# Patient Record
Sex: FEMALE | Race: WHITE | Employment: FULL TIME | URBAN - METROPOLITAN AREA
[De-identification: names, ages, dates, MRNs, and addresses within clinical notes are randomized per-mention and may not be internally consistent; named-entity substitution may affect disease eponyms.]

---

## 2021-10-06 ENCOUNTER — OFFICE VISIT (OUTPATIENT)
Dept: CARDIOLOGY CLINIC | Age: 41
End: 2021-10-06
Payer: COMMERCIAL

## 2021-10-06 VITALS
HEIGHT: 61 IN | SYSTOLIC BLOOD PRESSURE: 129 MMHG | BODY MASS INDEX: 23.41 KG/M2 | DIASTOLIC BLOOD PRESSURE: 82 MMHG | HEART RATE: 86 BPM | WEIGHT: 124 LBS

## 2021-10-06 DIAGNOSIS — R07.9 CHEST PAIN, UNSPECIFIED TYPE: Primary | ICD-10-CM

## 2021-10-06 PROCEDURE — 99203 OFFICE O/P NEW LOW 30 MIN: CPT | Performed by: INTERNAL MEDICINE

## 2021-10-06 PROCEDURE — 93000 ELECTROCARDIOGRAM COMPLETE: CPT | Performed by: INTERNAL MEDICINE

## 2021-10-06 NOTE — PROGRESS NOTES
1. Have you been to the ER, urgent care clinic since your last visit? Hospitalized since your last visit?     no    2. Have you seen or consulted any other health care providers outside of the [address] [place] [address] since your last visit? Include any pap smears or colon screening. no    3. Since your last visit, have you had any of the following symptoms? Sob.  Chest pains, some palpitations

## 2021-10-11 NOTE — PROGRESS NOTES
HISTORY OF PRESENT ILLNESS  Jessica Rodriguez is a 38 y.o. female. New Patient  The history is provided by the patient. This is a recurrent problem. The problem occurs every several days. The problem has not changed since onset. Associated symptoms include chest pain. Pertinent negatives include no abdominal pain, no headaches and no shortness of breath. Chest Pain   The history is provided by the patient. This is a recurrent problem. The problem has not changed since onset. The problem occurs every several days. The pain is at a severity of 3/10. The pain is mild. The quality of the pain is described as tightness. The pain radiates to the left shoulder. Pertinent negatives include no abdominal pain, no claudication, no cough, no diaphoresis, no dizziness, no fever, no headaches, no hemoptysis, no nausea, no orthopnea, no palpitations, no PND, no shortness of breath, no sputum production, no vomiting and no weakness. Review of Systems   Constitutional: Negative for chills, diaphoresis, fever and weight loss. HENT: Negative for ear pain and hearing loss. Eyes: Negative for blurred vision. Respiratory: Negative for cough, hemoptysis, sputum production, shortness of breath, wheezing and stridor. Cardiovascular: Positive for chest pain. Negative for palpitations, orthopnea, claudication, leg swelling and PND. Gastrointestinal: Negative for abdominal pain, heartburn, nausea and vomiting. Musculoskeletal: Negative for myalgias and neck pain. Skin: Negative for rash. Neurological: Negative for dizziness, tingling, tremors, focal weakness, loss of consciousness, weakness and headaches. Psychiatric/Behavioral: Negative for depression and suicidal ideas.      Family History   Problem Relation Age of Onset    Heart Disease Paternal Grandfather     Heart Disease Sister     Cancer Maternal Grandmother         breast cancer       Past Medical History:   Diagnosis Date    Foot pain, bilateral 2014 Past Surgical History:   Procedure Laterality Date    HX APPENDECTOMY  2009    HX  SECTION  , ,     HX TONSILLECTOMY      HX TUMOR REMOVAL  2002       Social History     Tobacco Use    Smoking status: Former Smoker    Smokeless tobacco: Never Used    Tobacco comment: 5 cigarettes a day   Substance Use Topics    Alcohol use: Yes     Comment: socially occ       No Known Allergies    Outpatient Medications Marked as Taking for the 10/6/21 encounter (Office Visit) with Andra Braun MD   Medication Sig Dispense Refill    ibuprofen (MOTRIN) 200 mg tablet Take  by mouth. Visit Vitals  /82   Pulse 86   Ht 5' 1\" (1.549 m)   Wt 56.2 kg (124 lb)   BMI 23.43 kg/m²       Physical Exam  Constitutional:       Appearance: She is well-developed. HENT:      Head: Normocephalic and atraumatic. Eyes:      General: No scleral icterus. Conjunctiva/sclera: Conjunctivae normal.   Neck:      Vascular: No JVD. Cardiovascular:      Rate and Rhythm: Normal rate and regular rhythm. Heart sounds: Normal heart sounds. No murmur heard. No friction rub. No gallop. Pulmonary:      Effort: Pulmonary effort is normal. No respiratory distress. Breath sounds: Normal breath sounds. No stridor. No wheezing or rales. Chest:      Chest wall: No tenderness. Abdominal:      General: There is no distension. Tenderness: There is no abdominal tenderness. Musculoskeletal:         General: No tenderness. Normal range of motion. Cervical back: Normal range of motion and neck supple. Lymphadenopathy:      Cervical: No cervical adenopathy. Skin:     Findings: No erythema or rash. Neurological:      Mental Status: She is alert and oriented to person, place, and time. Cranial Nerves: No cranial nerve deficit. Psychiatric:         Behavior: Behavior normal.     ekg sinus rhythm with no acute st-t changes    ASSESSMENT and PLAN    ICD-10-CM ICD-9-CM    1. Chest pain, unspecified type  R07.9 786.50 AMB POC EKG ROUTINE W/ 12 LEADS, INTER & REP      ECHO STRESS     Orders Placed This Encounter    AMB POC EKG ROUTINE W/ 12 LEADS, INTER & REP     Order Specific Question:   Reason for Exam:     Answer:   chest pain    ECHO STRESS     Standing Status:   Future     Standing Expiration Date:   4/6/2022     Order Specific Question:   Is Patient Pregnant? Answer:   Unknown     Order Specific Question:   Contrast Enhancement (Bubble Study, Definity, Optison) may be used if criteria listed in established evidence-based protocol has been identified. Answer:   Yes     Order Specific Question:   Enhanced Imaging (Myocardial Strain, 3D post-processing) may be used if criteria listed in established evidence-based protocol has been identified. Answer:   Yes     Order Specific Question:   Stressing Agent     Answer:   Exercise     Follow-up and Dispositions    · Return in about 3 weeks (around 10/27/2021). current treatment plan is effective, no change in therapy. Patient is a 38 yr old female with no significant past medical history seen for chest pain with both typical and atypical features. ekg sinus rhythm with no acute st-t changes  Will obtain stress echo to assess LV function.   F/u in 3 weeks

## 2021-11-03 ENCOUNTER — OFFICE VISIT (OUTPATIENT)
Dept: CARDIOLOGY CLINIC | Age: 41
End: 2021-11-03
Payer: COMMERCIAL

## 2021-11-03 VITALS
HEIGHT: 61 IN | WEIGHT: 125 LBS | OXYGEN SATURATION: 98 % | BODY MASS INDEX: 23.6 KG/M2 | DIASTOLIC BLOOD PRESSURE: 88 MMHG | SYSTOLIC BLOOD PRESSURE: 128 MMHG | HEART RATE: 84 BPM

## 2021-11-03 DIAGNOSIS — R07.9 CHEST PAIN, UNSPECIFIED TYPE: Primary | ICD-10-CM

## 2021-11-03 PROCEDURE — 99213 OFFICE O/P EST LOW 20 MIN: CPT | Performed by: INTERNAL MEDICINE

## 2021-11-03 NOTE — PROGRESS NOTES
HISTORY OF PRESENT ILLNESS  Arya Huston is a 38 y.o. female. Chest Pain (Angina)   The history is provided by the patient. This is a recurrent problem. The problem has not changed since onset. The problem occurs rarely. Pertinent negatives include no claudication, no cough, no diaphoresis, no dizziness, no fever, no hemoptysis, no nausea, no orthopnea, no palpitations, no PND, no sputum production, no vomiting and no weakness. Review of Systems   Constitutional: Negative for chills, diaphoresis, fever and weight loss. HENT: Negative for ear pain and hearing loss. Eyes: Negative for blurred vision. Respiratory: Negative for cough, hemoptysis, sputum production, wheezing and stridor. Cardiovascular: Positive for chest pain. Negative for palpitations, orthopnea, claudication, leg swelling and PND. Gastrointestinal: Negative for heartburn, nausea and vomiting. Musculoskeletal: Negative for myalgias and neck pain. Skin: Negative for rash. Neurological: Negative for dizziness, tingling, tremors, focal weakness, loss of consciousness and weakness. Psychiatric/Behavioral: Negative for depression and suicidal ideas.      Family History   Problem Relation Age of Onset    Heart Disease Paternal Grandfather     Heart Disease Sister     Cancer Maternal Grandmother         breast cancer       Past Medical History:   Diagnosis Date    Foot pain, bilateral        Past Surgical History:   Procedure Laterality Date    HX APPENDECTOMY      HX  SECTION  , ,     HX TONSILLECTOMY      HX TUMOR REMOVAL         Social History     Tobacco Use    Smoking status: Former Smoker    Smokeless tobacco: Never Used    Tobacco comment: 5 cigarettes a day   Substance Use Topics    Alcohol use: Yes     Comment: socially occ       No Known Allergies         Visit Vitals  /88 (BP 1 Location: Right arm, BP Patient Position: Sitting)   Pulse 84   Ht 5' 1\" (1.549 m)   Wt 56.7 kg (125 lb)   SpO2 98%   BMI 23.62 kg/m²       Physical Exam  Constitutional:       Appearance: She is well-developed. HENT:      Head: Normocephalic and atraumatic. Eyes:      General: No scleral icterus. Conjunctiva/sclera: Conjunctivae normal.   Neck:      Vascular: No JVD. Cardiovascular:      Rate and Rhythm: Normal rate and regular rhythm. Heart sounds: Normal heart sounds. No murmur heard. No friction rub. No gallop. Pulmonary:      Effort: Pulmonary effort is normal. No respiratory distress. Breath sounds: Normal breath sounds. No stridor. No wheezing or rales. Chest:      Chest wall: No tenderness. Abdominal:      General: There is no distension. Tenderness: There is no abdominal tenderness. Musculoskeletal:         General: No tenderness. Normal range of motion. Cervical back: Normal range of motion and neck supple. Lymphadenopathy:      Cervical: No cervical adenopathy. Skin:     Findings: No erythema or rash. Neurological:      Mental Status: She is alert and oriented to person, place, and time. Cranial Nerves: No cranial nerve deficit. Psychiatric:         Behavior: Behavior normal.     ekg sinus rhythm with no acute st-t changes      ASSESSMENT and PLAN    ICD-10-CM ICD-9-CM    1. Chest pain, unspecified type  R07.9 786.50      No orders of the defined types were placed in this encounter. Follow-up and Dispositions    · Return if symptoms worsen or fail to improve. current treatment plan is effective, no change in therapy. Patient is a 38 yr old female with no significant past medical history seen for chest pain with both typical and atypical features. ekg sinus rhythm with no acute st-t changes  Recent stress echo negative for WMA/ ekg changes. Likely from GERD. Recommend to follow up with PCP.

## 2021-12-02 ENCOUNTER — OFFICE VISIT (OUTPATIENT)
Dept: FAMILY MEDICINE CLINIC | Age: 41
End: 2021-12-02
Payer: COMMERCIAL

## 2021-12-02 VITALS
HEART RATE: 94 BPM | SYSTOLIC BLOOD PRESSURE: 114 MMHG | RESPIRATION RATE: 20 BRPM | OXYGEN SATURATION: 100 % | HEIGHT: 61 IN | TEMPERATURE: 98.4 F | WEIGHT: 127 LBS | DIASTOLIC BLOOD PRESSURE: 77 MMHG | BODY MASS INDEX: 23.98 KG/M2

## 2021-12-02 DIAGNOSIS — Z11.59 ENCOUNTER FOR HEPATITIS C SCREENING TEST FOR LOW RISK PATIENT: ICD-10-CM

## 2021-12-02 DIAGNOSIS — Z13.0 SCREENING FOR ENDOCRINE, METABOLIC, AND IMMUNITY DISORDER: ICD-10-CM

## 2021-12-02 DIAGNOSIS — Z13.29 SCREENING FOR ENDOCRINE, METABOLIC, AND IMMUNITY DISORDER: ICD-10-CM

## 2021-12-02 DIAGNOSIS — S16.1XXA STRAIN OF NECK MUSCLE, INITIAL ENCOUNTER: ICD-10-CM

## 2021-12-02 DIAGNOSIS — Z13.228 SCREENING FOR ENDOCRINE, METABOLIC, AND IMMUNITY DISORDER: ICD-10-CM

## 2021-12-02 LAB — HBA1C MFR BLD HPLC: 5.3 %

## 2021-12-02 PROCEDURE — 99203 OFFICE O/P NEW LOW 30 MIN: CPT | Performed by: FAMILY MEDICINE

## 2021-12-02 PROCEDURE — 83036 HEMOGLOBIN GLYCOSYLATED A1C: CPT | Performed by: FAMILY MEDICINE

## 2021-12-02 PROCEDURE — 36415 COLL VENOUS BLD VENIPUNCTURE: CPT | Performed by: FAMILY MEDICINE

## 2021-12-02 NOTE — PROGRESS NOTES
Patient presents for lab draw ordered by Dr Yunier Martinez  Ordering Department/Practice:  [facility]  Date Ordered:  12-2-2021     The following labs were drawn and sent to [facility]     CBC, Lipid Profile, CMP, TSH, 3rd Generation, HgA1C and Hep C screening    The following tubes were sent:    Gold  ( 2) and Lavender  ( 1)    Draw site:  LAC  Pain Level:0  Needle Gauge21  Aseptic technique used  Blood thinners:n  Band-Aid applied  Draw fee added   Procedure tolerated well, patient voiced no complaints.

## 2021-12-02 NOTE — PATIENT INSTRUCTIONS
Neck Strain: Care Instructions  Your Care Instructions     You have strained the muscles and ligaments in your neck. A sudden, awkward movement can strain the neck. This often occurs with falls or car accidents or during certain sports. Everyday activities like working on a computer or sleeping can also cause neck strain if they force you to hold your neck in an awkward position for a long time. It is common for neck pain to get worse for a day or two after an injury, but it should start to feel better after that. You may have more pain and stiffness for several days before it gets better. This is expected. It may take a few weeks or longer for it to heal completely. Good home treatment can help you get better faster and avoid future neck problems. Follow-up care is a key part of your treatment and safety. Be sure to make and go to all appointments, and call your doctor if you are having problems. It's also a good idea to know your test results and keep a list of the medicines you take. How can you care for yourself at home? · If you were given a neck brace (cervical collar) to limit neck motion, wear it as instructed for as many days as your doctor tells you to. Do not wear it longer than you were told to. Wearing a brace for too long can make neck stiffness worse and weaken the neck muscles. · You can try using heat or ice to see if it helps. ? Try using a heating pad on a low or medium setting for 15 to 20 minutes every 2 to 3 hours. Try a warm shower in place of one session with the heating pad. You can also buy single-use heat wraps that last up to 8 hours. ? You can also try an ice pack for 10 to 15 minutes every 2 to 3 hours. · Take pain medicines exactly as directed. ? If the doctor gave you a prescription medicine for pain, take it as prescribed. ? If you are not taking a prescription pain medicine, ask your doctor if you can take an over-the-counter medicine.   · Gently rub the area to relieve pain and help with blood flow. Do not massage the area if it hurts to do so. · Do not do anything that makes the pain worse. Take it easy for a couple of days. You can do your usual activities if they do not hurt your neck or put it at risk for more stress or injury. · Try sleeping on a special neck pillow. Place it under your neck, not under your head. Placing a tightly rolled-up towel under your neck while you sleep will also work. If you use a neck pillow or rolled towel, do not use your regular pillow at the same time. · To prevent future neck pain, do exercises to stretch and strengthen your neck and back. Learn how to use good posture, safe lifting techniques, and proper body mechanics. When should you call for help? Call 911 anytime you think you may need emergency care. For example, call if:    · You are unable to move an arm or a leg at all. Call your doctor now or seek immediate medical care if:    · You have new or worse symptoms in your arms, legs, chest, belly, or buttocks. Symptoms may include:  ? Numbness or tingling. ? Weakness. ? Pain.     · You lose bladder or bowel control. Watch closely for changes in your health, and be sure to contact your doctor if:    · You are not getting better as expected. Where can you learn more? Go to http://www.gray.com/  Enter M253 in the search box to learn more about \"Neck Strain: Care Instructions. \"  Current as of: July 1, 2021               Content Version: 13.0  © 2006-2021 [employer]. Care instructions adapted under license by [employer] (which disclaims liability or warranty for this information). If you have questions about a medical condition or this instruction, always ask your healthcare professional. [place] [address] any warranty or liability for your use of this information.

## 2021-12-02 NOTE — PROGRESS NOTES
HPI  This is a 79-year-old  female with past medical history significant for parotid gland tumor, breast abscess status post drainage 1 month ago who is here to establish care and for annual physical for work. Patient has recent mammogram done when she had breast abscess drained last month. She had pap smear done with her OBGYN at [facility], we will obtain records from. Left shoulder and neck pain  Noted yesterday, 5-6 out of 10 in intensity, aching. Pain with above head hand raising and slightly with turning the head to the left. Denies any tingling, numbness, weakness, weakened  strength, injury. 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     Past Medical History  Past Medical History:   Diagnosis Date    Foot pain, bilateral     History of carotid body tumor        Surgical History  Past Surgical History:   Procedure Laterality Date    HX APPENDECTOMY  2009    HX  SECTION  , ,     HX TONSILLECTOMY      HX TUMOR REMOVAL          Medications      Allergies  No Known Allergies    Family History  Family History   Problem Relation Age of Onset    Heart Disease Paternal Grandfather     Heart Disease Sister     Cancer Maternal Grandmother         breast cancer       Social History  Social History     Socioeconomic History    Marital status:      Spouse name: Not on file    Number of children: Not on file    Years of education: Not on file    Highest education level: Not on file   Occupational History    Not on file   Tobacco Use    Smoking status: Light Tobacco Smoker    Smokeless tobacco: Never Used    Tobacco comment: 5 cigarettes a day   Substance and Sexual Activity    Alcohol use: Yes     Comment: socially occ    Drug use: No    Sexual activity: Not on file   Other Topics Concern    Not on file   Social History Narrative    Not on file     Social Determinants of Health     Financial Resource Strain:  Difficulty of Paying Living Expenses: Not on file   Food Insecurity:     Worried About Running Out of Food in the Last Year: Not on file    Ran Out of Food in the Last Year: Not on file   Transportation Needs:     Lack of Transportation (Medical): Not on file    Lack of Transportation (Non-Medical): Not on file   Physical Activity:     Days of Exercise per Week: Not on file    Minutes of Exercise per Session: Not on file   Stress:     Feeling of Stress : Not on file   Social Connections:     Frequency of Communication with Friends and Family: Not on file    Frequency of Social Gatherings with Friends and Family: Not on file    Attends Catholic Services: Not on file    Active Member of [address] [place] [address] or Organizations: Not on file    Attends Club or Organization Meetings: Not on file    Marital Status: Not on file   Intimate Partner Violence:     Fear of Current or Ex-Partner: Not on file    Emotionally Abused: Not on file    Physically Abused: Not on file    Sexually Abused: Not on file   Housing Stability:     Unable to Pay for Housing in the Last Year: Not on file    Number of [place] in the Last Year: Not on file    Unstable Housing in the Last Year: Not on file       Review of Systems  Review of Systems   Constitutional: Negative for chills and fever. Respiratory: Negative for cough and shortness of breath. Cardiovascular: Negative for chest pain and leg swelling. Gastrointestinal: Negative for abdominal pain, nausea and vomiting. Musculoskeletal: Positive for neck pain. Skin: Negative for rash. Neurological: Negative for dizziness, tingling, weakness and headaches.          Vital Signs  Visit Vitals  /77 (BP 1 Location: Left upper arm, BP Patient Position: Sitting, BP Cuff Size: Adult long)   Pulse 94   Temp 98.4 °F (36.9 °C) (Oral)   Resp 20   Ht 5' 1\" (1.549 m)   Wt 127 lb (57.6 kg)   LMP 11/19/2021   SpO2 100%   BMI 24.00 kg/m²         Physical Exam  Physical Exam  Vitals reviewed. Constitutional:       Appearance: Normal appearance. HENT:      Head: Normocephalic and atraumatic. Right Ear: External ear normal.      Left Ear: External ear normal.      Nose: Nose normal.      Mouth/Throat:      Mouth: Mucous membranes are moist.   Eyes:      Extraocular Movements: Extraocular movements intact. Conjunctiva/sclera: Conjunctivae normal.   Cardiovascular:      Rate and Rhythm: Normal rate and regular rhythm. Pulses: Normal pulses. Heart sounds: Normal heart sounds. Pulmonary:      Effort: Pulmonary effort is normal.      Breath sounds: Normal breath sounds. Abdominal:      General: Bowel sounds are normal.      Palpations: Abdomen is soft. Musculoskeletal:         General: Tenderness present. No swelling, deformity or signs of injury. Normal range of motion. Cervical back: Normal range of motion. Comments: Pain with above head hand raising, pain with turning head to the left. Pain with liftoff test in the shoulder. 5 strength in bilateral upper extremities. Sensations intact, good . There is to palpation of the left-sided cervical muscles. Skin:     General: Skin is warm. Neurological:      General: No focal deficit present. Mental Status: She is alert and oriented to person, place, and time. Cranial Nerves: No cranial nerve deficit. Motor: No weakness.       Gait: Gait normal.   Psychiatric:         Mood and Affect: Mood normal.         Behavior: Behavior normal.                Results  Results for orders placed or performed in visit on 10/12/21   ECHO STRESS   Result Value Ref Range    Target  bpm    STRESS SR LYON TREADMILL SCORE 9     Estimated workload 11 METS    ST Elevation (mm) 0 mm    ST Depression (mm) 0 mm    Angina Index 0     Exercise duration time 9:20 min:sec    Baseline HR 89 bpm    Baseline  mmHg    Stress Base Diastolic BP 85 mmHg    Post peak  bpm    Percent  % Stress Base Systolic  mmHg    Stress Rate Pressure Product 33,600 bpm*mmHg    Stress Base Diastolic  mmHg    Stress Stage 1  bpm    Stress Stage 1 /78 mmHg    Stress Stage 2  bpm    Stress Stage 2 /82 mmHg    Stress Stage 3  bpm    Stress Stage 3 /94 mmHg    Recovery Stage 1  bpm    Recovery Stage 1 /100 mmHg    Recovery Stage 2  bpm    Recovery Stage 2 /78 mmHg    Recovery Stage 3 HR 98 bpm    Recovery Stage 3 /70 mmHg    Recovery Stage 4  bpm    Recovery Stage 4 /78 mmHg       ASSESSMENT and PLAN  1. Strain of neck muscle, initial encounter  Advised patient to used ibuprofen 600 mg 3 times daily. To be taken on a full stomach to prevent peptic ulcer disease. Counseled that she is also able to use BenGay or other muscle rubs or lidocaine patches to help with pain. Advised patient to perform neck exercises daily. Handout given to patient today. Advised patient to return should she have red flag signs or worsening of pain. 2. Screening for endocrine, metabolic, and immunity disorder  - CBC WITH AUTOMATED DIFF; Future  - METABOLIC PANEL, COMPREHENSIVE; Future  - LIPID PANEL; Future  - HEMOGLOBIN A1C WITH EAG; Future  - TSH 3RD GENERATION; Future    3. Encounter for hepatitis C screening test for low risk patient  - HEPATITIS C AB; Future           Follow-up and Dispositions    · Return in about 1 year (around 12/2/2022), or if symptoms worsen or fail to improve, for Follow up in 1 year for physical and labs to be drawn 1 week from now. I have discussed the diagnosis with the patient and the intended plan of care as seen in the above orders. The patient has received an after-visit summary and questions were answered concerning future plans. I have discussed medication, side effects, and warnings with the patient in detail. The patient verbalized understanding and is in agreement with the plan of care.  The patient will contact the office with any additional concerns. Vignesh Montelongo MD    PLEASE NOTE:   This document has been produced using voice recognition software.  Unrecognized errors in transcription may be present

## 2021-12-02 NOTE — PROGRESS NOTES
1. \"Have you been to the ER, urgent care clinic since your last visit? Hospitalized since your last visit? \" No    2. \"Have you seen or consulted any other health care providers outside of the [address] [place] [address] since your last visit? \" No     3. For patients aged 39-70: Has the patient had a colonoscopy / FIT/ Cologuard? No     If the patient is female:    4. For patients aged 41-77: Has the patient had a mammogram within the past 2 years? No    5. For patients aged 21-65: Has the patient had a pap smear?  No

## 2021-12-03 LAB
ALBUMIN SERPL-MCNC: 4.8 G/DL (ref 3.8–4.8)
ALBUMIN/GLOB SERPL: 2.1 {RATIO} (ref 1.2–2.2)
ALP SERPL-CCNC: 78 IU/L (ref 44–121)
ALT SERPL-CCNC: 19 IU/L (ref 0–32)
AST SERPL-CCNC: 26 IU/L (ref 0–40)
BASOPHILS # BLD AUTO: 0.1 X10E3/UL (ref 0–0.2)
BASOPHILS NFR BLD AUTO: 1 %
BILIRUB SERPL-MCNC: 0.3 MG/DL (ref 0–1.2)
BUN SERPL-MCNC: 10 MG/DL (ref 6–24)
BUN/CREAT SERPL: 17 (ref 9–23)
CALCIUM SERPL-MCNC: 9.2 MG/DL (ref 8.7–10.2)
CHLORIDE SERPL-SCNC: 99 MMOL/L (ref 96–106)
CHOLEST SERPL-MCNC: 215 MG/DL (ref 100–199)
CO2 SERPL-SCNC: 21 MMOL/L (ref 20–29)
CREAT SERPL-MCNC: 0.58 MG/DL (ref 0.57–1)
EOSINOPHIL # BLD AUTO: 0.1 X10E3/UL (ref 0–0.4)
EOSINOPHIL NFR BLD AUTO: 1 %
ERYTHROCYTE [DISTWIDTH] IN BLOOD BY AUTOMATED COUNT: 11.5 % (ref 11.7–15.4)
EST. AVERAGE GLUCOSE BLD GHB EST-MCNC: 111 MG/DL
GLOBULIN SER CALC-MCNC: 2.3 G/DL (ref 1.5–4.5)
GLUCOSE SERPL-MCNC: 97 MG/DL (ref 65–99)
HBA1C MFR BLD: 5.5 % (ref 4.8–5.6)
HCT VFR BLD AUTO: 43.4 % (ref 34–46.6)
HCV AB S/CO SERPL IA: <0.1 S/CO RATIO (ref 0–0.9)
HDLC SERPL-MCNC: 78 MG/DL
HGB BLD-MCNC: 15.2 G/DL (ref 11.1–15.9)
IMM GRANULOCYTES # BLD AUTO: 0 X10E3/UL (ref 0–0.1)
IMM GRANULOCYTES NFR BLD AUTO: 0 %
IMP & REVIEW OF LAB RESULTS: NORMAL
LDLC SERPL CALC-MCNC: 107 MG/DL (ref 0–99)
LYMPHOCYTES # BLD AUTO: 2.1 X10E3/UL (ref 0.7–3.1)
LYMPHOCYTES NFR BLD AUTO: 24 %
MCH RBC QN AUTO: 35.3 PG (ref 26.6–33)
MCHC RBC AUTO-ENTMCNC: 35 G/DL (ref 31.5–35.7)
MCV RBC AUTO: 101 FL (ref 79–97)
MONOCYTES # BLD AUTO: 0.8 X10E3/UL (ref 0.1–0.9)
MONOCYTES NFR BLD AUTO: 9 %
NEUTROPHILS # BLD AUTO: 5.6 X10E3/UL (ref 1.4–7)
NEUTROPHILS NFR BLD AUTO: 65 %
PLATELET # BLD AUTO: 201 X10E3/UL (ref 150–450)
POTASSIUM SERPL-SCNC: 4.5 MMOL/L (ref 3.5–5.2)
PROT SERPL-MCNC: 7.1 G/DL (ref 6–8.5)
RBC # BLD AUTO: 4.3 X10E6/UL (ref 3.77–5.28)
SODIUM SERPL-SCNC: 138 MMOL/L (ref 134–144)
SPECIMEN STATUS REPORT, ROLRST: NORMAL
TRIGL SERPL-MCNC: 179 MG/DL (ref 0–149)
TSH SERPL DL<=0.005 MIU/L-ACNC: 1.49 UIU/ML (ref 0.45–4.5)
VLDLC SERPL CALC-MCNC: 30 MG/DL (ref 5–40)
WBC # BLD AUTO: 8.7 X10E3/UL (ref 3.4–10.8)

## 2021-12-06 NOTE — PROGRESS NOTES
Would you mind telling this patient that her lab work shows that her cells are larger than what they should be and this could indicate possible O32 or folic acid deficiency or another underlying cause. We need to do further work up to see what this is due to. Would she be able to come in to the office to discuss this more and to talk about further work up? Her cholesterol was also slightly elevated and she would benefit from more fruits and vegetables in her diet. More lean meat like grilled chicken, fish, turkey. Less Red meat which elevated cholesterol, less fast food and food high in saturated fat content. It is also recommended to exercise at least 150 min a week even if it is just walking every day for 30 min.  Thank you

## 2021-12-08 ENCOUNTER — TELEPHONE (OUTPATIENT)
Dept: FAMILY MEDICINE CLINIC | Age: 41
End: 2021-12-08

## 2021-12-08 NOTE — TELEPHONE ENCOUNTER
Pt stated she had an appt last week and she was suppose to have a form filled out for work. Pt calling to check on the status of the form. Ms. Vlad Lu stated she needs the letter before Dec. 15th. Pt can be reached at [phone]. Please advise.  Thank you!!!

## 2021-12-08 NOTE — TELEPHONE ENCOUNTER
Spoke with Pt. After verifying identity. She was upset about us not having a copy of paperwork. she said she will fax a copy today. I Let her know we will fill it out by the end of the day. She verbalized understanding.

## 2022-11-22 ENCOUNTER — TELEPHONE (OUTPATIENT)
Dept: FAMILY MEDICINE CLINIC | Age: 42
End: 2022-11-22

## 2022-11-22 NOTE — TELEPHONE ENCOUNTER
Patient would have to establish care first, during that visit there would be no paperwork completion per provider. As far as dates to be scheduled the scheduled is already set. If patient can't accept what is being offered we cannot help.    Thank you

## 2022-11-22 NOTE — TELEPHONE ENCOUNTER
Pt left a message with [facility] that she needed an appt with baljeet before dec. 15th to establish care for paperwork for job. I called the pt and the 1st appt. Available was dec. 16th. Pt  said that didn't help her and decided not to schedule at this time and may call back.